# Patient Record
Sex: FEMALE | URBAN - METROPOLITAN AREA
[De-identification: names, ages, dates, MRNs, and addresses within clinical notes are randomized per-mention and may not be internally consistent; named-entity substitution may affect disease eponyms.]

---

## 2024-04-15 ENCOUNTER — OV NP (OUTPATIENT)
Dept: URBAN - METROPOLITAN AREA CLINIC 115 | Facility: CLINIC | Age: 54
End: 2024-04-15
Payer: COMMERCIAL

## 2024-04-15 VITALS
WEIGHT: 204.2 LBS | HEIGHT: 65 IN | HEART RATE: 102 BPM | SYSTOLIC BLOOD PRESSURE: 125 MMHG | DIASTOLIC BLOOD PRESSURE: 79 MMHG | TEMPERATURE: 97.5 F | BODY MASS INDEX: 34.02 KG/M2

## 2024-04-15 DIAGNOSIS — R10.13 EPIGASTRIC PAIN: ICD-10-CM

## 2024-04-15 DIAGNOSIS — K62.5 RECTAL BLEEDING: ICD-10-CM

## 2024-04-15 DIAGNOSIS — K64.8 INTERNAL HEMORRHOID: ICD-10-CM

## 2024-04-15 DIAGNOSIS — R07.89 ATYPICAL CHEST PAIN: ICD-10-CM

## 2024-04-15 DIAGNOSIS — R13.19 OTHER DYSPHAGIA: ICD-10-CM

## 2024-04-15 DIAGNOSIS — K59.09 CHRONIC CONSTIPATION: ICD-10-CM

## 2024-04-15 PROBLEM — 79922009: Status: ACTIVE | Noted: 2024-04-15

## 2024-04-15 PROBLEM — 102589003: Status: ACTIVE | Noted: 2024-04-15

## 2024-04-15 PROBLEM — 90458007: Status: ACTIVE | Noted: 2024-04-15

## 2024-04-15 PROBLEM — 12063002: Status: ACTIVE | Noted: 2024-04-15

## 2024-04-15 PROBLEM — 236069009: Status: ACTIVE | Noted: 2024-04-15

## 2024-04-15 PROBLEM — 40739000: Status: ACTIVE | Noted: 2024-04-15

## 2024-04-15 PROCEDURE — 99204 OFFICE O/P NEW MOD 45 MIN: CPT

## 2024-04-15 RX ORDER — PANTOPRAZOLE SODIUM 40 MG/1
1 TABLET TABLET, DELAYED RELEASE ORAL ONCE A DAY
Qty: 30 | Refills: 1 | OUTPATIENT
Start: 2024-04-15

## 2024-04-15 RX ORDER — AMLODIPINE BESYLATE 5 MG/1
1 TABLET TABLET ORAL ONCE A DAY
Status: ACTIVE | COMMUNITY

## 2024-04-15 NOTE — PHYSICAL EXAM GASTROINTESTINAL
Abdomen , soft, epigastric to LUQ mildly tender, nondistended , no guarding or rigidity , no masses palpable

## 2024-04-15 NOTE — HPI-TODAY'S VISIT:
52 y/o F with PMH of HTN, IBS, lymphoma  presents with c/o dysphagia x3 weeks, mostly with foods but now progressed with water. States she was seeing Dr. Gutierrez at [facility] (pt requested records) and decided to switch care over to Dr. Chua.  States she was having GI issues since 2017 until they found an intestinal blockage s/p bowel resection which turned out to be lymphoma, now in remission. This morning, she had rectal bleeding BRBPR without straining. BMs are anywhere from BID to q2days; sometimes Type 1, varies in softness, sometimes long; reports having to physically maneuver her buttocks to have a successful BM over the past 6 mos. Tried Metamucil, Miralax 1 cap a day leading to diarrhea, so was resorting to eating grapes which helped. States the nausea precedes a BM, and rectal pain with BM.  Reports suprapubic abdominal pain, constipation, nausea, rectal bleeding, rectal itching, bloating, hemorrhoids, fecal urgency. Chest pain on the R side when she cannot go to the bathroom; has not been seen by cardiologist. Sees a neurologist for unexplained dizziness and BUE numbness. Denies vomiting, diarrhea, odynophagia, heartburn, unintentional weight loss, change in appetite, early satiety. Denies NSAID use. Denies EtOH/tobacco/marijuana use.  Last colonoscopy: 4 yrs ago; some polyps removed per pt; rec'd repeat 5Y

## 2024-04-23 ENCOUNTER — OV NP (OUTPATIENT)
Dept: URBAN - METROPOLITAN AREA CLINIC 115 | Facility: CLINIC | Age: 54
End: 2024-04-23

## 2024-05-24 ENCOUNTER — LAB OUTSIDE AN ENCOUNTER (OUTPATIENT)
Dept: URBAN - METROPOLITAN AREA CLINIC 115 | Facility: CLINIC | Age: 54
End: 2024-05-24

## 2024-05-24 ENCOUNTER — TELEPHONE ENCOUNTER (OUTPATIENT)
Dept: URBAN - METROPOLITAN AREA CLINIC 115 | Facility: CLINIC | Age: 54
End: 2024-05-24

## 2024-05-29 ENCOUNTER — OFFICE VISIT (OUTPATIENT)
Dept: URBAN - METROPOLITAN AREA CLINIC 115 | Facility: CLINIC | Age: 54
End: 2024-05-29

## 2024-06-17 ENCOUNTER — OFFICE VISIT (OUTPATIENT)
Dept: URBAN - METROPOLITAN AREA CLINIC 115 | Facility: CLINIC | Age: 54
End: 2024-06-17

## 2024-06-17 NOTE — HPI-TODAY'S VISIT:
52 y/o F with PMH of HTN, IBS, lymphoma  presents with c/o dysphagia x3 weeks, mostly with foods but now progressed with water. 4/15/24: States she was seeing Dr. Gutierrez at [facility] (pt requested records) and decided to switch care over to Dr. Chua.  States she was having GI issues since 2017 until they found an intestinal blockage s/p bowel resection which turned out to be lymphoma, now in remission. This morning, she had rectal bleeding BRBPR without straining. BMs are anywhere from BID to q2days; sometimes Type 1, varies in softness, sometimes long; reports having to physically maneuver her buttocks to have a successful BM over the past 6 mos. Tried Metamucil, Miralax 1 cap a day leading to diarrhea, so was resorting to eating grapes which helped. States the nausea precedes a BM, and rectal pain with BM.  Reports suprapubic abdominal pain, constipation, nausea, rectal bleeding, rectal itching, bloating, hemorrhoids, fecal urgency. Chest pain on the R side when she cannot go to the bathroom; has not been seen by cardiologist. Sees a neurologist for unexplained dizziness and BUE numbness. Denies vomiting, diarrhea, odynophagia, heartburn, unintentional weight loss, change in appetite, early satiety. Denies NSAID use. Denies EtOH/tobacco/marijuana use.  Last colonoscopy: 4 yrs ago; some polyps removed per pt; rec'd repeat 5Y 6/17/24: Since last visit, pt was started on pantoprazole 40mg. Today she states her epigastric and chest pain is . She was also started on miralax 1 cap qod or half cap a day and high fiber diet. Rectal itching is . Bloating is . Nausea .

## 2024-07-03 ENCOUNTER — TELEPHONE ENCOUNTER (OUTPATIENT)
Dept: URBAN - METROPOLITAN AREA CLINIC 115 | Facility: CLINIC | Age: 54
End: 2024-07-03

## 2024-07-23 ENCOUNTER — CLAIMS CREATED FROM THE CLAIM WINDOW (OUTPATIENT)
Dept: URBAN - METROPOLITAN AREA SURGERY CENTER 13 | Facility: SURGERY CENTER | Age: 54
End: 2024-07-23
Payer: COMMERCIAL

## 2024-07-23 ENCOUNTER — TELEPHONE ENCOUNTER (OUTPATIENT)
Dept: URBAN - METROPOLITAN AREA CLINIC 82 | Facility: CLINIC | Age: 54
End: 2024-07-23

## 2024-07-23 ENCOUNTER — CLAIMS CREATED FROM THE CLAIM WINDOW (OUTPATIENT)
Dept: URBAN - METROPOLITAN AREA CLINIC 4 | Facility: CLINIC | Age: 54
End: 2024-07-23
Payer: COMMERCIAL

## 2024-07-23 DIAGNOSIS — K31.89 OTHER DISEASES OF STOMACH AND DUODENUM: ICD-10-CM

## 2024-07-23 DIAGNOSIS — K31.89 ACHYLIA: ICD-10-CM

## 2024-07-23 DIAGNOSIS — R19.4 ALTERATION IN BOWEL ELIMINATION: ICD-10-CM

## 2024-07-23 DIAGNOSIS — K21.9 ACID REFLUX: ICD-10-CM

## 2024-07-23 DIAGNOSIS — K21.9 GASTRO-ESOPHAGEAL REFLUX DISEASE WITHOUT ESOPHAGITIS: ICD-10-CM

## 2024-07-23 PROCEDURE — 88305 TISSUE EXAM BY PATHOLOGIST: CPT | Performed by: PATHOLOGY

## 2024-07-23 PROCEDURE — 43239 EGD BIOPSY SINGLE/MULTIPLE: CPT | Performed by: INTERNAL MEDICINE

## 2024-07-23 PROCEDURE — 45378 DIAGNOSTIC COLONOSCOPY: CPT | Performed by: INTERNAL MEDICINE

## 2024-07-23 PROCEDURE — 88312 SPECIAL STAINS GROUP 1: CPT | Performed by: PATHOLOGY

## 2024-07-23 RX ORDER — FAMOTIDINE 40 MG/1
1 TABLET AT BEDTIME TABLET, FILM COATED ORAL ONCE A DAY
Qty: 90 TABLET | Refills: 1 | OUTPATIENT
Start: 2024-07-23

## 2024-07-23 RX ORDER — PANTOPRAZOLE SODIUM 40 MG/1
TAKE 1 TABLET BY MOUTH DAILY TABLET, DELAYED RELEASE ORAL
Qty: 90 TABLET | Refills: 0 | Status: ACTIVE | COMMUNITY

## 2024-07-23 RX ORDER — AMLODIPINE BESYLATE 5 MG/1
1 TABLET TABLET ORAL ONCE A DAY
Status: ACTIVE | COMMUNITY